# Patient Record
Sex: FEMALE | Race: BLACK OR AFRICAN AMERICAN | Employment: UNEMPLOYED | URBAN - METROPOLITAN AREA
[De-identification: names, ages, dates, MRNs, and addresses within clinical notes are randomized per-mention and may not be internally consistent; named-entity substitution may affect disease eponyms.]

---
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
## 2020-09-21 LAB
ABO, EXTERNAL RESULT: NORMAL
ABO/RH: NORMAL
ANTIBODY SCREEN: NORMAL
C. TRACHOMATIS, EXTERNAL RESULT: NEGATIVE
HEP B, EXTERNAL RESULT: NORMAL
HEPATITIS C ANTIBODY INTERPRETATION: NORMAL
HEPATITIS C ANTIBODY, EXTERNAL RESULT: NORMAL
HIV, EXTERNAL RESULT: NORMAL
N. GONORRHOEAE, EXTERNAL RESULT: NEGATIVE
RH FACTOR, EXTERNAL RESULT: NORMAL
RPR, EXTERNAL RESULT: NORMAL
RUBELLA TITER, EXTERNAL RESULT: NORMAL

## 2020-09-22 LAB
BASOPHILS ABSOLUTE: 0.1 K/UL (ref 0–0.2)
BASOPHILS RELATIVE PERCENT: 0.6 %
EOSINOPHILS ABSOLUTE: 0 K/UL (ref 0–0.6)
EOSINOPHILS RELATIVE PERCENT: 0.5 %
HCT VFR BLD CALC: 34.7 % (ref 36–48)
HEMOGLOBIN: 11.4 G/DL (ref 12–16)
HEPATITIS B SURFACE ANTIGEN INTERPRETATION: ABNORMAL
HIV AG/AB: NORMAL
HIV ANTIGEN: NORMAL
HIV-1 ANTIBODY: NORMAL
HIV-2 AB: NORMAL
LYMPHOCYTES ABSOLUTE: 2.3 K/UL (ref 1–5.1)
LYMPHOCYTES RELATIVE PERCENT: 22.9 %
MCH RBC QN AUTO: 26.4 PG (ref 26–34)
MCHC RBC AUTO-ENTMCNC: 32.8 G/DL (ref 31–36)
MCV RBC AUTO: 80.5 FL (ref 80–100)
MONOCYTES ABSOLUTE: 0.5 K/UL (ref 0–1.3)
MONOCYTES RELATIVE PERCENT: 5.4 %
NEUTROPHILS ABSOLUTE: 7 K/UL (ref 1.7–7.7)
NEUTROPHILS RELATIVE PERCENT: 70.6 %
PDW BLD-RTO: 16 % (ref 12.4–15.4)
PLATELET # BLD: 445 K/UL (ref 135–450)
PMV BLD AUTO: 8.3 FL (ref 5–10.5)
RBC # BLD: 4.32 M/UL (ref 4–5.2)
RUBELLA ANTIBODY IGG: 77 IU/ML
TOTAL SYPHILLIS IGG/IGM: ABNORMAL
WBC # BLD: 9.9 K/UL (ref 4–11)

## 2020-09-23 LAB
ORGANISM: ABNORMAL
URINE CULTURE, ROUTINE: ABNORMAL
URINE CULTURE, ROUTINE: ABNORMAL

## 2021-02-18 LAB
GLUCOSE CHALLENGE: 96 MG/DL
HCT VFR BLD CALC: 28.3 % (ref 36–48)
HEMOGLOBIN: 9.2 G/DL (ref 12–16)
MCH RBC QN AUTO: 27.3 PG (ref 26–34)
MCHC RBC AUTO-ENTMCNC: 32.7 G/DL (ref 31–36)
MCV RBC AUTO: 83.4 FL (ref 80–100)
PDW BLD-RTO: 14.5 % (ref 12.4–15.4)
PLATELET # BLD: 388 K/UL (ref 135–450)
PMV BLD AUTO: 8.1 FL (ref 5–10.5)
RBC # BLD: 3.39 M/UL (ref 4–5.2)
WBC # BLD: 9.2 K/UL (ref 4–11)

## 2021-04-21 LAB — GBS, EXTERNAL RESULT: POSITIVE

## 2021-04-24 ENCOUNTER — ANESTHESIA (OUTPATIENT)
Dept: LABOR AND DELIVERY | Age: 21
DRG: 540 | End: 2021-04-24
Payer: COMMERCIAL

## 2021-04-24 ENCOUNTER — ANESTHESIA EVENT (OUTPATIENT)
Dept: LABOR AND DELIVERY | Age: 21
DRG: 540 | End: 2021-04-24
Payer: COMMERCIAL

## 2021-04-24 ENCOUNTER — HOSPITAL ENCOUNTER (INPATIENT)
Age: 21
LOS: 2 days | Discharge: HOME | DRG: 540 | End: 2021-04-26
Attending: OBSTETRICS & GYNECOLOGY | Admitting: OBSTETRICS & GYNECOLOGY
Payer: COMMERCIAL

## 2021-04-24 VITALS
TEMPERATURE: 98.2 F | RESPIRATION RATE: 1 BRPM | OXYGEN SATURATION: 96 % | DIASTOLIC BLOOD PRESSURE: 54 MMHG | SYSTOLIC BLOOD PRESSURE: 111 MMHG

## 2021-04-24 LAB
ABO/RH: NORMAL
AMPHETAMINE SCREEN, URINE: NORMAL
ANTIBODY SCREEN: NORMAL
APTT: 28.3 SEC (ref 24.2–36.2)
BARBITURATE SCREEN URINE: NORMAL
BASOPHILS ABSOLUTE: 0 K/UL (ref 0–0.2)
BASOPHILS ABSOLUTE: 0.1 K/UL (ref 0–0.2)
BASOPHILS RELATIVE PERCENT: 0.6 %
BASOPHILS RELATIVE PERCENT: 0.7 %
BENZODIAZEPINE SCREEN, URINE: NORMAL
BLOOD BANK DISPENSE STATUS: NORMAL
BLOOD BANK DISPENSE STATUS: NORMAL
BLOOD BANK PRODUCT CODE: NORMAL
BLOOD BANK PRODUCT CODE: NORMAL
BPU ID: NORMAL
BPU ID: NORMAL
BUPRENORPHINE URINE: NORMAL
CANNABINOID SCREEN URINE: NORMAL
COCAINE METABOLITE SCREEN URINE: NORMAL
DESCRIPTION BLOOD BANK: NORMAL
DESCRIPTION BLOOD BANK: NORMAL
EOSINOPHILS ABSOLUTE: 0 K/UL (ref 0–0.6)
EOSINOPHILS ABSOLUTE: 0 K/UL (ref 0–0.6)
EOSINOPHILS RELATIVE PERCENT: 0.1 %
EOSINOPHILS RELATIVE PERCENT: 0.5 %
FIBRINOGEN: 236 MG/DL (ref 200–397)
HCT VFR BLD CALC: 24.7 % (ref 36–48)
HCT VFR BLD CALC: 27.1 % (ref 36–48)
HCT VFR BLD CALC: 35.4 % (ref 36–48)
HEMOGLOBIN: 11 G/DL (ref 12–16)
HEMOGLOBIN: 7.7 G/DL (ref 12–16)
HEMOGLOBIN: 8.6 G/DL (ref 12–16)
INR BLD: 1 (ref 0.86–1.14)
LYMPHOCYTES ABSOLUTE: 1.2 K/UL (ref 1–5.1)
LYMPHOCYTES ABSOLUTE: 1.3 K/UL (ref 1–5.1)
LYMPHOCYTES RELATIVE PERCENT: 19 %
LYMPHOCYTES RELATIVE PERCENT: 9.7 %
Lab: NORMAL
MCH RBC QN AUTO: 24.2 PG (ref 26–34)
MCH RBC QN AUTO: 24.3 PG (ref 26–34)
MCH RBC QN AUTO: 24.5 PG (ref 26–34)
MCHC RBC AUTO-ENTMCNC: 31.1 G/DL (ref 31–36)
MCHC RBC AUTO-ENTMCNC: 31.2 G/DL (ref 31–36)
MCHC RBC AUTO-ENTMCNC: 31.6 G/DL (ref 31–36)
MCV RBC AUTO: 77.4 FL (ref 80–100)
MCV RBC AUTO: 77.7 FL (ref 80–100)
MCV RBC AUTO: 78 FL (ref 80–100)
METHADONE SCREEN, URINE: NORMAL
MONOCYTES ABSOLUTE: 0.2 K/UL (ref 0–1.3)
MONOCYTES ABSOLUTE: 0.2 K/UL (ref 0–1.3)
MONOCYTES RELATIVE PERCENT: 1.3 %
MONOCYTES RELATIVE PERCENT: 3.1 %
NEUTROPHILS ABSOLUTE: 11 K/UL (ref 1.7–7.7)
NEUTROPHILS ABSOLUTE: 5.4 K/UL (ref 1.7–7.7)
NEUTROPHILS RELATIVE PERCENT: 76.8 %
NEUTROPHILS RELATIVE PERCENT: 88.2 %
OPIATE SCREEN URINE: NORMAL
OXYCODONE URINE: NORMAL
PDW BLD-RTO: 16.7 % (ref 12.4–15.4)
PDW BLD-RTO: 16.7 % (ref 12.4–15.4)
PDW BLD-RTO: 17 % (ref 12.4–15.4)
PH UA: 6
PHENCYCLIDINE SCREEN URINE: NORMAL
PLATELET # BLD: 259 K/UL (ref 135–450)
PLATELET # BLD: 329 K/UL (ref 135–450)
PLATELET # BLD: 330 K/UL (ref 135–450)
PMV BLD AUTO: 7.3 FL (ref 5–10.5)
PMV BLD AUTO: 7.3 FL (ref 5–10.5)
PMV BLD AUTO: 7.5 FL (ref 5–10.5)
PROPOXYPHENE SCREEN: NORMAL
PROTHROMBIN TIME: 11.6 SEC (ref 10–13.2)
RBC # BLD: 3.16 M/UL (ref 4–5.2)
RBC # BLD: 3.51 M/UL (ref 4–5.2)
RBC # BLD: 4.56 M/UL (ref 4–5.2)
WBC # BLD: 12.4 K/UL (ref 4–11)
WBC # BLD: 6.5 K/UL (ref 4–11)
WBC # BLD: 7.1 K/UL (ref 4–11)

## 2021-04-24 PROCEDURE — 2500000003 HC RX 250 WO HCPCS: Performed by: OBSTETRICS & GYNECOLOGY

## 2021-04-24 PROCEDURE — 7100000000 HC PACU RECOVERY - FIRST 15 MIN: Performed by: OBSTETRICS & GYNECOLOGY

## 2021-04-24 PROCEDURE — 3609079900 HC CESAREAN SECTION: Performed by: OBSTETRICS & GYNECOLOGY

## 2021-04-24 PROCEDURE — 85610 PROTHROMBIN TIME: CPT

## 2021-04-24 PROCEDURE — 86923 COMPATIBILITY TEST ELECTRIC: CPT

## 2021-04-24 PROCEDURE — 86850 RBC ANTIBODY SCREEN: CPT

## 2021-04-24 PROCEDURE — 80307 DRUG TEST PRSMV CHEM ANLYZR: CPT

## 2021-04-24 PROCEDURE — 2709999900 HC NON-CHARGEABLE SUPPLY: Performed by: OBSTETRICS & GYNECOLOGY

## 2021-04-24 PROCEDURE — 3700000001 HC ADD 15 MINUTES (ANESTHESIA): Performed by: OBSTETRICS & GYNECOLOGY

## 2021-04-24 PROCEDURE — 1200000000 HC SEMI PRIVATE

## 2021-04-24 PROCEDURE — 85027 COMPLETE CBC AUTOMATED: CPT

## 2021-04-24 PROCEDURE — 6360000002 HC RX W HCPCS: Performed by: NURSE ANESTHETIST, CERTIFIED REGISTERED

## 2021-04-24 PROCEDURE — 85025 COMPLETE CBC W/AUTO DIFF WBC: CPT

## 2021-04-24 PROCEDURE — 6370000000 HC RX 637 (ALT 250 FOR IP)

## 2021-04-24 PROCEDURE — 2500000003 HC RX 250 WO HCPCS: Performed by: NURSE ANESTHETIST, CERTIFIED REGISTERED

## 2021-04-24 PROCEDURE — 36430 TRANSFUSION BLD/BLD COMPNT: CPT

## 2021-04-24 PROCEDURE — P9016 RBC LEUKOCYTES REDUCED: HCPCS

## 2021-04-24 PROCEDURE — 6370000000 HC RX 637 (ALT 250 FOR IP): Performed by: OBSTETRICS & GYNECOLOGY

## 2021-04-24 PROCEDURE — 2580000003 HC RX 258: Performed by: NURSE ANESTHETIST, CERTIFIED REGISTERED

## 2021-04-24 PROCEDURE — 86780 TREPONEMA PALLIDUM: CPT

## 2021-04-24 PROCEDURE — 7100000001 HC PACU RECOVERY - ADDTL 15 MIN: Performed by: OBSTETRICS & GYNECOLOGY

## 2021-04-24 PROCEDURE — 2580000003 HC RX 258: Performed by: OBSTETRICS & GYNECOLOGY

## 2021-04-24 PROCEDURE — 85730 THROMBOPLASTIN TIME PARTIAL: CPT

## 2021-04-24 PROCEDURE — 6360000002 HC RX W HCPCS: Performed by: OBSTETRICS & GYNECOLOGY

## 2021-04-24 PROCEDURE — 6360000002 HC RX W HCPCS

## 2021-04-24 PROCEDURE — 86900 BLOOD TYPING SEROLOGIC ABO: CPT

## 2021-04-24 PROCEDURE — 85384 FIBRINOGEN ACTIVITY: CPT

## 2021-04-24 PROCEDURE — 3700000000 HC ANESTHESIA ATTENDED CARE: Performed by: OBSTETRICS & GYNECOLOGY

## 2021-04-24 PROCEDURE — 86901 BLOOD TYPING SEROLOGIC RH(D): CPT

## 2021-04-24 RX ORDER — SODIUM CHLORIDE, SODIUM LACTATE, POTASSIUM CHLORIDE, AND CALCIUM CHLORIDE .6; .31; .03; .02 G/100ML; G/100ML; G/100ML; G/100ML
INJECTION, SOLUTION INTRAVENOUS ONCE
Status: DISCONTINUED | OUTPATIENT
Start: 2021-04-24 | End: 2021-04-24

## 2021-04-24 RX ORDER — SODIUM CHLORIDE 0.9 % (FLUSH) 0.9 %
10 SYRINGE (ML) INJECTION PRN
Status: DISCONTINUED | OUTPATIENT
Start: 2021-04-24 | End: 2021-04-24

## 2021-04-24 RX ORDER — OXYCODONE HYDROCHLORIDE 5 MG/1
5 TABLET ORAL EVERY 4 HOURS PRN
Status: DISCONTINUED | OUTPATIENT
Start: 2021-04-24 | End: 2021-04-26 | Stop reason: HOSPADM

## 2021-04-24 RX ORDER — DEXAMETHASONE SODIUM PHOSPHATE 4 MG/ML
INJECTION, SOLUTION INTRA-ARTICULAR; INTRALESIONAL; INTRAMUSCULAR; INTRAVENOUS; SOFT TISSUE PRN
Status: DISCONTINUED | OUTPATIENT
Start: 2021-04-24 | End: 2021-04-24 | Stop reason: SDUPTHER

## 2021-04-24 RX ORDER — SODIUM CHLORIDE 9 MG/ML
INJECTION, SOLUTION INTRAVENOUS PRN
Status: DISCONTINUED | OUTPATIENT
Start: 2021-04-24 | End: 2021-04-26 | Stop reason: HOSPADM

## 2021-04-24 RX ORDER — OXYCODONE HYDROCHLORIDE 5 MG/1
10 TABLET ORAL EVERY 4 HOURS PRN
Status: DISCONTINUED | OUTPATIENT
Start: 2021-04-24 | End: 2021-04-26 | Stop reason: HOSPADM

## 2021-04-24 RX ORDER — ACETAMINOPHEN 500 MG
1000 TABLET ORAL EVERY 8 HOURS SCHEDULED
Status: DISCONTINUED | OUTPATIENT
Start: 2021-04-24 | End: 2021-04-26 | Stop reason: HOSPADM

## 2021-04-24 RX ORDER — SODIUM CHLORIDE 0.9 % (FLUSH) 0.9 %
10 SYRINGE (ML) INJECTION EVERY 12 HOURS SCHEDULED
Status: DISCONTINUED | OUTPATIENT
Start: 2021-04-24 | End: 2021-04-24

## 2021-04-24 RX ORDER — SODIUM CHLORIDE 9 MG/ML
INJECTION, SOLUTION INTRAVENOUS PRN
Status: COMPLETED | OUTPATIENT
Start: 2021-04-24 | End: 2021-04-24

## 2021-04-24 RX ORDER — TRISODIUM CITRATE DIHYDRATE AND CITRIC ACID MONOHYDRATE 500; 334 MG/5ML; MG/5ML
30 SOLUTION ORAL ONCE
Status: COMPLETED | OUTPATIENT
Start: 2021-04-24 | End: 2021-04-24

## 2021-04-24 RX ORDER — OXYTOCIN 10 [USP'U]/ML
INJECTION, SOLUTION INTRAMUSCULAR; INTRAVENOUS PRN
Status: DISCONTINUED | OUTPATIENT
Start: 2021-04-24 | End: 2021-04-24 | Stop reason: SDUPTHER

## 2021-04-24 RX ORDER — SODIUM CHLORIDE 0.9 % (FLUSH) 0.9 %
5-40 SYRINGE (ML) INJECTION EVERY 12 HOURS SCHEDULED
Status: DISCONTINUED | OUTPATIENT
Start: 2021-04-24 | End: 2021-04-26 | Stop reason: HOSPADM

## 2021-04-24 RX ORDER — SODIUM CHLORIDE, SODIUM LACTATE, POTASSIUM CHLORIDE, CALCIUM CHLORIDE 600; 310; 30; 20 MG/100ML; MG/100ML; MG/100ML; MG/100ML
INJECTION, SOLUTION INTRAVENOUS CONTINUOUS
Status: DISCONTINUED | OUTPATIENT
Start: 2021-04-24 | End: 2021-04-24

## 2021-04-24 RX ORDER — MODIFIED LANOLIN
OINTMENT (GRAM) TOPICAL
Status: DISCONTINUED | OUTPATIENT
Start: 2021-04-24 | End: 2021-04-26 | Stop reason: HOSPADM

## 2021-04-24 RX ORDER — MISOPROSTOL 100 UG/1
TABLET ORAL
Status: COMPLETED
Start: 2021-04-24 | End: 2021-04-24

## 2021-04-24 RX ORDER — KETOROLAC TROMETHAMINE 30 MG/ML
INJECTION, SOLUTION INTRAMUSCULAR; INTRAVENOUS PRN
Status: DISCONTINUED | OUTPATIENT
Start: 2021-04-24 | End: 2021-04-24 | Stop reason: SDUPTHER

## 2021-04-24 RX ORDER — IBUPROFEN 800 MG/1
800 TABLET ORAL EVERY 8 HOURS PRN
Qty: 30 TABLET | Refills: 0 | Status: SHIPPED | OUTPATIENT
Start: 2021-04-24

## 2021-04-24 RX ORDER — OXYCODONE HYDROCHLORIDE 5 MG/1
5 TABLET ORAL EVERY 6 HOURS PRN
Qty: 12 TABLET | Refills: 0 | Status: SHIPPED | OUTPATIENT
Start: 2021-04-24 | End: 2021-04-27

## 2021-04-24 RX ORDER — BUPIVACAINE HYDROCHLORIDE 7.5 MG/ML
INJECTION, SOLUTION INTRASPINAL PRN
Status: DISCONTINUED | OUTPATIENT
Start: 2021-04-24 | End: 2021-04-24 | Stop reason: SDUPTHER

## 2021-04-24 RX ORDER — ONDANSETRON 2 MG/ML
INJECTION INTRAMUSCULAR; INTRAVENOUS PRN
Status: DISCONTINUED | OUTPATIENT
Start: 2021-04-24 | End: 2021-04-24 | Stop reason: SDUPTHER

## 2021-04-24 RX ORDER — ACETAMINOPHEN 500 MG
1000 TABLET ORAL ONCE
Status: COMPLETED | OUTPATIENT
Start: 2021-04-24 | End: 2021-04-24

## 2021-04-24 RX ORDER — METHYLERGONOVINE MALEATE 0.2 MG/ML
INJECTION INTRAVENOUS
Status: COMPLETED
Start: 2021-04-24 | End: 2021-04-24

## 2021-04-24 RX ORDER — METOCLOPRAMIDE HYDROCHLORIDE 5 MG/ML
10 INJECTION INTRAMUSCULAR; INTRAVENOUS ONCE
Status: COMPLETED | OUTPATIENT
Start: 2021-04-24 | End: 2021-04-24

## 2021-04-24 RX ORDER — KETOROLAC TROMETHAMINE 30 MG/ML
30 INJECTION, SOLUTION INTRAMUSCULAR; INTRAVENOUS EVERY 8 HOURS
Status: DISCONTINUED | OUTPATIENT
Start: 2021-04-24 | End: 2021-04-25 | Stop reason: ALTCHOICE

## 2021-04-24 RX ORDER — DOCUSATE SODIUM 100 MG/1
100 CAPSULE, LIQUID FILLED ORAL 2 TIMES DAILY
Status: DISCONTINUED | OUTPATIENT
Start: 2021-04-24 | End: 2021-04-26 | Stop reason: HOSPADM

## 2021-04-24 RX ORDER — IBUPROFEN 800 MG/1
800 TABLET ORAL EVERY 8 HOURS SCHEDULED
Status: DISCONTINUED | OUTPATIENT
Start: 2021-04-24 | End: 2021-04-26 | Stop reason: HOSPADM

## 2021-04-24 RX ORDER — SODIUM CHLORIDE 9 MG/ML
25 INJECTION, SOLUTION INTRAVENOUS PRN
Status: DISCONTINUED | OUTPATIENT
Start: 2021-04-24 | End: 2021-04-26 | Stop reason: HOSPADM

## 2021-04-24 RX ORDER — MORPHINE SULFATE 1 MG/ML
INJECTION, SOLUTION EPIDURAL; INTRATHECAL; INTRAVENOUS PRN
Status: DISCONTINUED | OUTPATIENT
Start: 2021-04-24 | End: 2021-04-24 | Stop reason: SDUPTHER

## 2021-04-24 RX ORDER — ACETAMINOPHEN 325 MG/1
650 TABLET ORAL EVERY 4 HOURS PRN
Status: DISCONTINUED | OUTPATIENT
Start: 2021-04-24 | End: 2021-04-26 | Stop reason: HOSPADM

## 2021-04-24 RX ORDER — SODIUM CHLORIDE, SODIUM LACTATE, POTASSIUM CHLORIDE, CALCIUM CHLORIDE 600; 310; 30; 20 MG/100ML; MG/100ML; MG/100ML; MG/100ML
INJECTION, SOLUTION INTRAVENOUS CONTINUOUS PRN
Status: DISCONTINUED | OUTPATIENT
Start: 2021-04-24 | End: 2021-04-24 | Stop reason: SDUPTHER

## 2021-04-24 RX ORDER — SODIUM CHLORIDE 9 MG/ML
25 INJECTION, SOLUTION INTRAVENOUS PRN
Status: DISCONTINUED | OUTPATIENT
Start: 2021-04-24 | End: 2021-04-24

## 2021-04-24 RX ORDER — SODIUM CHLORIDE, SODIUM LACTATE, POTASSIUM CHLORIDE, CALCIUM CHLORIDE 600; 310; 30; 20 MG/100ML; MG/100ML; MG/100ML; MG/100ML
INJECTION, SOLUTION INTRAVENOUS CONTINUOUS
Status: DISCONTINUED | OUTPATIENT
Start: 2021-04-24 | End: 2021-04-26 | Stop reason: HOSPADM

## 2021-04-24 RX ORDER — SODIUM CHLORIDE, SODIUM LACTATE, POTASSIUM CHLORIDE, AND CALCIUM CHLORIDE .6; .31; .03; .02 G/100ML; G/100ML; G/100ML; G/100ML
1000 INJECTION, SOLUTION INTRAVENOUS ONCE
Status: COMPLETED | OUTPATIENT
Start: 2021-04-24 | End: 2021-04-24

## 2021-04-24 RX ORDER — SODIUM CHLORIDE, SODIUM LACTATE, POTASSIUM CHLORIDE, CALCIUM CHLORIDE 600; 310; 30; 20 MG/100ML; MG/100ML; MG/100ML; MG/100ML
1000 INJECTION, SOLUTION INTRAVENOUS ONCE
Status: DISCONTINUED | OUTPATIENT
Start: 2021-04-24 | End: 2021-04-24

## 2021-04-24 RX ORDER — PROMETHAZINE HYDROCHLORIDE 25 MG/ML
INJECTION, SOLUTION INTRAMUSCULAR; INTRAVENOUS PRN
Status: DISCONTINUED | OUTPATIENT
Start: 2021-04-24 | End: 2021-04-24 | Stop reason: SDUPTHER

## 2021-04-24 RX ORDER — SODIUM CHLORIDE 0.9 % (FLUSH) 0.9 %
5-40 SYRINGE (ML) INJECTION PRN
Status: DISCONTINUED | OUTPATIENT
Start: 2021-04-24 | End: 2021-04-26 | Stop reason: HOSPADM

## 2021-04-24 RX ADMIN — SODIUM CHLORIDE: 9 INJECTION, SOLUTION INTRAVENOUS at 13:22

## 2021-04-24 RX ADMIN — SODIUM CHLORIDE, POTASSIUM CHLORIDE, SODIUM LACTATE AND CALCIUM CHLORIDE: 600; 310; 30; 20 INJECTION, SOLUTION INTRAVENOUS at 15:54

## 2021-04-24 RX ADMIN — CEFAZOLIN SODIUM 2000 MG: 10 INJECTION, POWDER, FOR SOLUTION INTRAVENOUS at 09:45

## 2021-04-24 RX ADMIN — MISOPROSTOL 1000 MCG: 100 TABLET ORAL at 12:38

## 2021-04-24 RX ADMIN — KETOROLAC TROMETHAMINE 30 MG: 30 INJECTION, SOLUTION INTRAMUSCULAR; INTRAVENOUS at 11:08

## 2021-04-24 RX ADMIN — METOCLOPRAMIDE 10 MG: 5 INJECTION, SOLUTION INTRAMUSCULAR; INTRAVENOUS at 09:46

## 2021-04-24 RX ADMIN — Medication 166.7 ML: at 12:55

## 2021-04-24 RX ADMIN — Medication 87.3 MILLI-UNITS/MIN: at 12:09

## 2021-04-24 RX ADMIN — KETOROLAC TROMETHAMINE 30 MG: 30 INJECTION, SOLUTION INTRAMUSCULAR; INTRAVENOUS at 17:27

## 2021-04-24 RX ADMIN — FAMOTIDINE 20 MG: 10 INJECTION, SOLUTION INTRAVENOUS at 09:46

## 2021-04-24 RX ADMIN — DEXAMETHASONE SODIUM PHOSPHATE 8 MG: 4 INJECTION, SOLUTION INTRAMUSCULAR; INTRAVENOUS at 11:08

## 2021-04-24 RX ADMIN — ACETAMINOPHEN 1000 MG: 500 TABLET ORAL at 09:46

## 2021-04-24 RX ADMIN — MORPHINE SULFATE 3.8 MG: 1 INJECTION EPIDURAL; INTRATHECAL; INTRAVENOUS at 11:08

## 2021-04-24 RX ADMIN — ONDANSETRON 4 MG: 2 INJECTION INTRAMUSCULAR; INTRAVENOUS at 10:37

## 2021-04-24 RX ADMIN — MORPHINE SULFATE 0.2 MG: 1 INJECTION EPIDURAL; INTRATHECAL; INTRAVENOUS at 10:41

## 2021-04-24 RX ADMIN — CEFAZOLIN SODIUM 2000 MG: 10 INJECTION, POWDER, FOR SOLUTION INTRAVENOUS at 17:28

## 2021-04-24 RX ADMIN — OXYTOCIN 20 UNITS: 10 INJECTION, SOLUTION INTRAMUSCULAR; INTRAVENOUS at 11:04

## 2021-04-24 RX ADMIN — SODIUM CITRATE AND CITRIC ACID MONOHYDRATE 30 ML: 500; 334 SOLUTION ORAL at 09:46

## 2021-04-24 RX ADMIN — PROMETHAZINE HYDROCHLORIDE 25 MG: 25 INJECTION INTRAMUSCULAR; INTRAVENOUS at 11:08

## 2021-04-24 RX ADMIN — BUPIVACAINE HYDROCHLORIDE 2 ML: 7.5 INJECTION, SOLUTION INTRASPINAL at 10:41

## 2021-04-24 RX ADMIN — SODIUM CHLORIDE, POTASSIUM CHLORIDE, SODIUM LACTATE AND CALCIUM CHLORIDE 1000 ML: 600; 310; 30; 20 INJECTION, SOLUTION INTRAVENOUS at 08:42

## 2021-04-24 RX ADMIN — SODIUM CHLORIDE, POTASSIUM CHLORIDE, SODIUM LACTATE AND CALCIUM CHLORIDE: 600; 310; 30; 20 INJECTION, SOLUTION INTRAVENOUS at 10:37

## 2021-04-24 RX ADMIN — ACETAMINOPHEN 1000 MG: 500 TABLET ORAL at 22:34

## 2021-04-24 RX ADMIN — METHYLERGONOVINE MALEATE 200 MCG: 0.2 INJECTION INTRAVENOUS at 12:43

## 2021-04-24 RX ADMIN — SODIUM CHLORIDE, POTASSIUM CHLORIDE, SODIUM LACTATE AND CALCIUM CHLORIDE: 600; 310; 30; 20 INJECTION, SOLUTION INTRAVENOUS at 10:57

## 2021-04-24 ASSESSMENT — PULMONARY FUNCTION TESTS
PIF_VALUE: 0
PIF_VALUE: 1
PIF_VALUE: 1
PIF_VALUE: 0
PIF_VALUE: 90
PIF_VALUE: 0
PIF_VALUE: 31
PIF_VALUE: 0
PIF_VALUE: 1
PIF_VALUE: 0
PIF_VALUE: 1
PIF_VALUE: 0
PIF_VALUE: 1
PIF_VALUE: 0
PIF_VALUE: 1
PIF_VALUE: 0
PIF_VALUE: -1
PIF_VALUE: 0
PIF_VALUE: 1
PIF_VALUE: 0

## 2021-04-24 ASSESSMENT — PAIN SCALES - GENERAL: PAINLEVEL_OUTOF10: 0

## 2021-04-24 NOTE — PROGRESS NOTES
CTSP with increased bleeding and passing clots. Approx 500 cc of blood and clot on the pad. QBL 1400 in OR. 1000 micrograms of cytotec placed rectally, 0.2mg of methergine given IM. Bleeding decreased. VSS. Stat Hg 7.7 (8.6 preop). Continue close observation. Transfuse 1 u PRBC as anticipate Hg<6 when equilibrates.

## 2021-04-24 NOTE — DISCHARGE SUMMARY
Obstetrical Discharge Form    Gestational Age: 39w0d    Antepartum complications: Previous Cesaren Section, Scant care, Anemia    Date of Delivery: 21      Type of Delivery: Repeat LTCS    Delivered By: Ligia Mann. Wilkinson    Baby:      Information for the patient's :  Kp Shah [[id number]]   APGAR One: 9     Information for the patient's :  Kp Shah [[id number]]   APGAR Five: 8     Information for the patient's :  Kp Pablo [[id number]]   Birth Weight: 8 lb 12.7 oz (3.99 kg)       Anesthesia: Epidural    Intrapartum complications: None    Postpartum complications: anemia    Consulted by Internists for chest pain, sinus bradycardia, no additional intervention needed    Discharge Medication:    Timbo Bush   Home Medication Instructions [id number]    Printed on:21 7877   Medication Information                      ibuprofen (ADVIL;MOTRIN) 800 MG tablet  Take 1 tablet by mouth every 8 hours as needed for Pain                  Discharge Condition:  good    Discharge Date: 21    PLAN:  Follow up in 6 weeks for routine PP visit  All questions answered  D/C summary begun at delivery for D/C planning purposes, any delay in discharge from ordered D/C date due to  factors.

## 2021-04-24 NOTE — PROGRESS NOTES
session: Not on file    Stress: Not on file   Relationships    Social connections     Talks on phone: Not on file     Gets together: Not on file     Attends Synagogue service: Not on file     Active member of club or organization: Not on file     Attends meetings of clubs or organizations: Not on file     Relationship status: Not on file    Intimate partner violence     Fear of current or ex partner: Not on file     Emotionally abused: Not on file     Physically abused: Not on file     Forced sexual activity: Not on file   Other Topics Concern    Not on file   Social History Narrative    Not on file     Family History:       Problem Relation Age of Onset    High Blood Pressure Mother     Stroke Paternal Grandfather     High Blood Pressure Father      Medications Prior to Admission:  Medications Prior to Admission: Prenatal MV-Min-Fe Fum-FA-DHA (PRENATAL 1 PO), Take 1 tablet by mouth daily  ferrous sulfate 325 (65 Fe) MG tablet, Take 325 mg by mouth daily (with breakfast)  [DISCONTINUED] ibuprofen (ADVIL;MOTRIN) 800 MG tablet, Take 1 tablet by mouth every 6 hours as needed for Pain  [DISCONTINUED] albuterol (ACCUNEB) 0.63 MG/3ML nebulizer solution, Take 1 ampule by nebulization every 6 hours as needed for Wheezing Indications: inhaler    REVIEW OF SYSTEMS:    CONSTITUTIONAL:  negative  RESPIRATORY:  negative  CARDIOVASCULAR:  negative  GASTROINTESTINAL:  negative  ALLERGIC/IMMUNOLOGIC:  negative  NEUROLOGICAL:  negative  BEHAVIOR/PSYCH:  negative    PHYSICAL EXAM:  Vitals:    04/24/21 0832 04/24/21 0855   BP:  131/65   Pulse:  74   Resp:  18   Temp:  98.1 °F (36.7 °C)   TempSrc:  Oral   Weight: 207 lb (93.9 kg)    Height: 5' 2\" (1.575 m)      General appearance:  awake, alert, cooperative, no apparent distress, and appears stated age  Neurologic:  Awake, alert, oriented to name, place and time.     Lungs:  No increased work of breathing, good air exchange  Abdomen:  Soft, non tender, gravid, consistent with

## 2021-04-24 NOTE — FLOWSHEET NOTE
Patient transferred to postpartum by self and settled into postpartum room 4411. Pt oriented to folder and postpartum care. Oriented to call light, phone and ordering meals. Postpartum RN's name and phone number posted for pt. Siderails up x2. Pt oriented to equipment. Report given. Pt included in discussion and all questions answered. Discharge prescriptions given to pt for Ibuprofen and oxycodone with instructions on use and side effects. See AVS.  Pt verbalized understanding of medications.

## 2021-04-24 NOTE — PROCEDURES
Department of Obstetrics and Gynecology  Obstetrical Operative Report         Pre-operative Diagnosis:  Previous  Section , Anemia, Scant care  Post-operative Diagnosis:  Same    Procedure:  repeat low transverse  section    Surgeon:   Kayden Amin     Assistant(s):   Mich Quintero    Anesthesia:  Spinal anesthesia    Tubes, uterus, ovaries:  normal    Estimated blood loss:  700ml    Specimens:  none    Complications:  none    Information for the patient's :  Savanah Garcia [[id number]]          Condition:    Infant stable, transfer to General Care Nursery  Mother stable, transfer to labor & delivery room    TECHNIQUE:  The patient was brought to the operating room and following the  placement of adequate Spinal Anesthesia, the patient was placed in dorsal  supine position and wedged to the left. A Higginbotham catheter was placed in the  patient's bladder and Juve pumps were placed on her lower extremities for  DVT prophylaxis. Following this, patient was prepped and draped in the usual  sterile manner for a  section. A transverse incision was made in the skin with a scalpel. Incision was carried down through fat to the fascia which was incised in the midline. Fascial incision was extended bilaterally  using curved scissors. Fascia was then bluntly and sharply dissected off underlying muscles and then muscle was sharply and bluntly dissected  in the midline exposing the underlying peritoneum which was entered bluntly. Filmy and dense adhesions lysed from uterus to anterior abdominal wall. The incision was again extended superiorly and inferiorly, taking  care so as not to injure intraabdominal organs or bladder. An Christopher retractor was inserted. A  Low transverse uterine incision was then made with a scalpel. Upon entering the cavity, clear fluid was noted. The incision was extended bilaterally. The infant was then easily delivered from a cephalic presentation.   Upon delivery of the infant, nose and mouth were bulb suctioned. Cord was clamped and cut and the infant was handed off the table  in a routine sterile manner. Cord gases were collected. Cord blood was collected. The placenta was manually removed. The incision was then closed in 1 layers using a 0 - V lock. The incision was reexamined and noted to be hemostatic. Clots were removed. The bladder was back filled with sterile milk, bladder integrity confirmed. The peritoneum was closed using 3-0 vicryl suture. The Fascia was closed with 0-vicryl  The fat was reapproximated using a running 3-0  Vicryl suture. Skin was reapproximated using subcuticular  4-0 Vicryl  suture. All sponge, needle, instrument and lap were reported as correct at  the end of the procedure. The patient tolerated the procedure well and went  to the recovery room in stable condition.

## 2021-04-24 NOTE — FLOWSHEET NOTE
Charge nurse and Dr Farhana Chance called to come assess patient with the increase in bleeding noted with this fundal check. Additional staff called, pt medicated with cytotec and methergine and pitocin bolused. Labs drawn and orders obtained to transfuse one unit PRBC's and redraw CBC post transfusion. Stephanie Mosher

## 2021-04-24 NOTE — ANESTHESIA PROCEDURE NOTES
Spinal Block    Start time: 4/24/2021 10:37 AM  Reason for block: post-op pain management and primary anesthetic  Staffing  Performed: resident/CRNA   Anesthesiologist: Alexandre Paz MD  Resident/CRNA: JOJO Subramanian CRNA  Preanesthetic Checklist  Completed: patient identified, IV checked, site marked, risks and benefits discussed, surgical consent, monitors and equipment checked, pre-op evaluation, timeout performed, anesthesia consent given, oxygen available and patient being monitored  Spinal Block  Patient position: sitting  Prep: ChloraPrep and site prepped and draped  Patient monitoring: cardiac monitor, continuous pulse ox, continuous capnometry and frequent blood pressure checks  Approach: midline  Location: L3/L4  Provider prep: mask and sterile gloves  Anesthesia block local: 25G. Dose: 0.2  Agent: bupivacaine (HYPERBARIC)  Adjuvant: duramorph  Dose: 2  Dose: 2  Needle  Needle type: Pencan   Needle gauge: 25 G  Needle length: 3.5 in  Assessment  Sensory level: T4  Swirl obtained: Yes  CSF: clear  Attempts: 1  Hemodynamics: stable  Additional Notes  To OR assist to table O2 and monitors applied. Timeout performed. Sterile chloraprep X 2. 3m 1% lidocaine(25g) at L3-4. 25g Pencan X1. Positive clear CSF X4. Negative paraesthesia or pain with injection. Good T4 obtained.

## 2021-04-24 NOTE — ANESTHESIA PRE PROCEDURE
ringers infusion 1,000 mL  1,000 mL Intravenous Once Apple Mayes MD           Allergies:  No Known Allergies    Problem List:    Patient Active Problem List   Diagnosis Code    Iron deficiency anemia of mother during pregnancy O99.019, D50.9    Urinary tract infection in mother during third trimester of pregnancy O23.43    Term pregnancy delivered O80       Past Medical History:        Diagnosis Date    Anemia     Fe and Venofer       Past Surgical History:        Procedure Laterality Date     SECTION N/A 2019     SECTION performed by aJred Kc MD at [facility] L[facility] OR       Social History:    Social History     Tobacco Use    Smoking status: Never Smoker    Smokeless tobacco: Never Used   Substance Use Topics    Alcohol use: No                                Counseling given: Not Answered      Vital Signs (Current):   Vitals:    21 0832   Weight: 207 lb (93.9 kg)   Height: 5' 2\" (1.575 m)                                              BP Readings from Last 3 Encounters:   19 124/78   19 125/62   19 (!) 105/56       NPO Status: Time of last liquid consumption:                         Time of last solid consumption:                         Date of last liquid consumption: 21                        Date of last solid food consumption: 21    BMI:   Wt Readings from Last 3 Encounters:   21 207 lb (93.9 kg)   19 211 lb (95.7 kg) (98 %, Z= 2.10)*   19 178 lb (80.7 kg) (94 %, Z= 1.58)*     * Growth percentiles are based on CDC (Girls, 2-20 Years) data. Body mass index is 37.86 kg/m².     CBC:   Lab Results   Component Value Date    WBC 6.5 2021    RBC 3.51 2021    HGB 8.6 2021    HCT 27.1 2021    MCV 77.4 2021    RDW 17.0 2021     2021       CMP: No results found for: NA, K, CL, CO2, BUN, CREATININE, GFRAA, AGRATIO, LABGLOM, GLUCOSE, PROT, CALCIUM, BILITOT, ALKPHOS, AST, ALT    POC Tests: No results for input(s): POCGLU, POCNA, POCK, POCCL, POCBUN, POCHEMO, POCHCT in the last 72 hours. Coags: No results found for: PROTIME, INR, APTT    HCG (If Applicable): No results found for: PREGTESTUR, PREGSERUM, HCG, HCGQUANT     ABGs: No results found for: PHART, PO2ART, URM6SSQ, SHA2YNU, BEART, X2ERKRKX     Type & Screen (If Applicable):  No results found for: LABABO, LABRH    Drug/Infectious Status (If Applicable):  No results found for: HIV, HEPCAB    COVID-19 Screening (If Applicable): No results found for: COVID19        Anesthesia Evaluation  Patient summary reviewed and Nursing notes reviewed  Airway: Mallampati: II        Dental: normal exam         Pulmonary:Negative Pulmonary ROS and normal exam  breath sounds clear to auscultation                             Cardiovascular:Negative CV ROS  Exercise tolerance: good (>4 METS),         NYHA Classification: I    Rhythm: regular  Rate: normal           Beta Blocker:  Not on Beta Blocker         Neuro/Psych:   Negative Neuro/Psych ROS              GI/Hepatic/Renal: Neg GI/Hepatic/Renal ROS            Endo/Other:    (+) blood dyscrasia: anemia:., .          Pt had no PAT visit       Abdominal:           Vascular: negative vascular ROS. Anesthesia Plan      spinal     ASA 2       Induction: rapid sequence. MIPS: Postoperative opioids intended and Prophylactic antiemetics administered. Anesthetic plan and risks discussed with patient. Use of blood products discussed with patient whom consented to blood products. Plan discussed with attending. Discussed spinal procedure for  and risks, benefits, and alternatives, including but not limited to infection, changes in VS, n/v, severe headache, paresthesia, intravascular injection, rare temporary or permanent injury and failed block with GETA back up.  Discussed the addition of duramorph to the spinal. All patient's questions answered. Patient states understands anesthesia plan and the side effects of duramorph and agrees to its utilization and agrees to proceed.  Choice of anesthetic will be determined by clinical condition at the time of anesthesia initiation        JOJO Hinkle CRNA   4/24/2021

## 2021-04-25 LAB
ANION GAP SERPL CALCULATED.3IONS-SCNC: 9 MMOL/L (ref 3–16)
BUN BLDV-MCNC: 9 MG/DL (ref 7–20)
CALCIUM SERPL-MCNC: 8.4 MG/DL (ref 8.3–10.6)
CHLORIDE BLD-SCNC: 106 MMOL/L (ref 99–110)
CO2: 24 MMOL/L (ref 21–32)
CREAT SERPL-MCNC: 0.6 MG/DL (ref 0.6–1.1)
GFR AFRICAN AMERICAN: >60
GFR NON-AFRICAN AMERICAN: >60
GLUCOSE BLD-MCNC: 86 MG/DL (ref 70–99)
HCT VFR BLD CALC: 31.9 % (ref 36–48)
HEMOGLOBIN: 10.2 G/DL (ref 12–16)
MCH RBC QN AUTO: 25.1 PG (ref 26–34)
MCHC RBC AUTO-ENTMCNC: 32 G/DL (ref 31–36)
MCV RBC AUTO: 78.4 FL (ref 80–100)
PDW BLD-RTO: 17 % (ref 12.4–15.4)
PLATELET # BLD: 304 K/UL (ref 135–450)
PMV BLD AUTO: 7.5 FL (ref 5–10.5)
POTASSIUM REFLEX MAGNESIUM: 3.7 MMOL/L (ref 3.5–5.1)
RBC # BLD: 4.06 M/UL (ref 4–5.2)
SODIUM BLD-SCNC: 139 MMOL/L (ref 136–145)
TOTAL SYPHILLIS IGG/IGM: NORMAL
TROPONIN: <0.01 NG/ML
WBC # BLD: 12.8 K/UL (ref 4–11)

## 2021-04-25 PROCEDURE — 6370000000 HC RX 637 (ALT 250 FOR IP): Performed by: OBSTETRICS & GYNECOLOGY

## 2021-04-25 PROCEDURE — 6360000002 HC RX W HCPCS: Performed by: OBSTETRICS & GYNECOLOGY

## 2021-04-25 PROCEDURE — 2580000003 HC RX 258: Performed by: OBSTETRICS & GYNECOLOGY

## 2021-04-25 PROCEDURE — 1200000000 HC SEMI PRIVATE

## 2021-04-25 PROCEDURE — 84484 ASSAY OF TROPONIN QUANT: CPT

## 2021-04-25 PROCEDURE — 80048 BASIC METABOLIC PNL TOTAL CA: CPT

## 2021-04-25 PROCEDURE — 93005 ELECTROCARDIOGRAM TRACING: CPT | Performed by: OBSTETRICS & GYNECOLOGY

## 2021-04-25 PROCEDURE — 85027 COMPLETE CBC AUTOMATED: CPT

## 2021-04-25 RX ADMIN — ACETAMINOPHEN 1000 MG: 500 TABLET ORAL at 14:08

## 2021-04-25 RX ADMIN — IBUPROFEN 800 MG: 800 TABLET, FILM COATED ORAL at 09:22

## 2021-04-25 RX ADMIN — ACETAMINOPHEN 1000 MG: 500 TABLET ORAL at 05:53

## 2021-04-25 RX ADMIN — SODIUM CHLORIDE, POTASSIUM CHLORIDE, SODIUM LACTATE AND CALCIUM CHLORIDE: 600; 310; 30; 20 INJECTION, SOLUTION INTRAVENOUS at 00:13

## 2021-04-25 RX ADMIN — DOCUSATE SODIUM 100 MG: 100 CAPSULE, LIQUID FILLED ORAL at 09:23

## 2021-04-25 RX ADMIN — DOCUSATE SODIUM 100 MG: 100 CAPSULE, LIQUID FILLED ORAL at 20:46

## 2021-04-25 RX ADMIN — Medication 20 ML: at 09:23

## 2021-04-25 RX ADMIN — OXYCODONE 5 MG: 5 TABLET ORAL at 17:53

## 2021-04-25 RX ADMIN — ACETAMINOPHEN 1000 MG: 500 TABLET ORAL at 22:04

## 2021-04-25 RX ADMIN — Medication 10 ML: at 20:47

## 2021-04-25 RX ADMIN — CEFAZOLIN SODIUM 2000 MG: 10 INJECTION, POWDER, FOR SOLUTION INTRAVENOUS at 01:46

## 2021-04-25 RX ADMIN — KETOROLAC TROMETHAMINE 30 MG: 30 INJECTION, SOLUTION INTRAMUSCULAR; INTRAVENOUS at 01:44

## 2021-04-25 RX ADMIN — IBUPROFEN 800 MG: 800 TABLET, FILM COATED ORAL at 17:53

## 2021-04-25 ASSESSMENT — PAIN SCALES - GENERAL: PAINLEVEL_OUTOF10: 7

## 2021-04-25 NOTE — CONSULTS
drug use. Drug: Marijuana. She reports that she does not drink alcohol. Family History:  family history includes High Blood Pressure in her father and mother; Stroke in her paternal grandfather. ,    Physical Exam:  /79   Pulse 57   Temp 97.9 °F (36.6 °C) (Oral)   Resp 18   Ht 5' 2\" (1.575 m)   Wt 207 lb (93.9 kg)   LMP 2020   SpO2 98%   Breastfeeding Unknown   BMI 37.86 kg/m²   Reproducible chest pain  General appearance: Appears comfortable. Well nourished  Eyes: Sclera clear, pupils equal  ENT: Moist mucus membranes, no thrush  Neck: Trachea midline, symmetrical  Cardiovascular: Regular rhythm, normal S1, S2. No murmur, gallop, rub. No edema in  lower extremities  Respiratory: Clear to auscultation bilaterally. No wheeze. Good inspiratory effort  Musculoskeletal: No cyanosis in digits, warm extremities  Neurologic: Cranial nerves grossly intact, no motor or speech deficits. Psychiatric: Normal affect. Alert and oriented to time, place and person. Skin: Warm, dry, normal turgor, no rash    Labs:  CBC:   Lab Results   Component Value Date    WBC 12.8 2021    RBC 4.06 2021    HGB 10.2 2021    HCT 31.9 2021    MCV 78.4 2021    MCH 25.1 2021    MCHC 32.0 2021    RDW 17.0 2021     2021    MPV 7.5 2021     BMP:  No results found for: NA, K, CL, CO2, BUN, CREATININE, CALCIUM, GFRAA, LABGLOM, GLUCOSE       EKG strips normal sinus rhythm sinus bradycardia    Discussed with attending physician    Problem List:    Active Problems:     delivery delivered  Resolved Problems:    * No resolved hospital problems. *       Assessment & Plan:   Chest pain  Atypical  Serial troponin, monitor on telemetry    Sinus bradycardia in a young person  Monitor on telemetry for 24 hours    Acute blood loss anemia status post packed RBC  Status post  section    Thank you for the opportunity to participate in the care of your patient. Brinda Alfaro MD   4/25/2021 2:29 PM

## 2021-04-25 NOTE — PROGRESS NOTES
Subjective:     Postpartum Day 1:  Delivery    The patient feels well but c/o chest pain when she walks. The patient denies emotional concerns. Pain is well controlled with current medications. The baby iswell. Urinary output is adequate. The patient is ambulating well. The patient is tolerating a normal diet. Flatus has been passed. Objective:      Patient Vitals for the past 8 hrs:   BP Temp Temp src Pulse Resp SpO2   21 1230 124/79 97.9 °F (36.6 °C) Oral 57 18    21 0936    58     21 0830 127/85 97.8 °F (36.6 °C) Oral (!) 46 18 98 %     General:    alert, appears stated age and cooperative   Bowel Sounds:  active   Lochia:  appropriate   Uterine Fundus:   firm   Incision:  healing well, no significant drainage, no dehiscence, no significant erythema   DVT Evaluation:  No evidence of DVT seen on physical exam.     Assessment:     Status post  section. Postoperative course complicated by PP hemorrhage, s/p 2 units of RBC. Chest pain and intermittent bradycardia- Will get EKG. Plan:     Continue current care.

## 2021-04-25 NOTE — FLOWSHEET NOTE
Plan of care is for postpartum unit to contact cardiology office tomorrow morning to see about setting pt up with a holter monitor per Dr. Aracely Gilmore.

## 2021-04-25 NOTE — FLOWSHEET NOTE
This RN spoke with Dr. Tommy Latham about pt's HR of 46 upon awakening this morning. Pt is asymptomatic. Rechecked HR at 0936 and it was 62. Her pulse ox was 98% at 0830. No new orders at this time. Will continue to monitor and update Dr. Tommy Latham as needed.

## 2021-04-26 VITALS
HEART RATE: 66 BPM | WEIGHT: 207 LBS | DIASTOLIC BLOOD PRESSURE: 79 MMHG | BODY MASS INDEX: 38.09 KG/M2 | RESPIRATION RATE: 16 BRPM | TEMPERATURE: 97.2 F | SYSTOLIC BLOOD PRESSURE: 130 MMHG | HEIGHT: 62 IN | OXYGEN SATURATION: 100 %

## 2021-04-26 PROBLEM — E66.9 OBESITY (BMI 30-39.9): Status: ACTIVE | Noted: 2021-04-26

## 2021-04-26 PROBLEM — R00.1 BRADYCARDIA: Status: ACTIVE | Noted: 2021-04-26

## 2021-04-26 PROBLEM — D62 ACUTE BLOOD LOSS ANEMIA: Status: ACTIVE | Noted: 2021-04-26

## 2021-04-26 PROCEDURE — 6370000000 HC RX 637 (ALT 250 FOR IP): Performed by: OBSTETRICS & GYNECOLOGY

## 2021-04-26 PROCEDURE — 2580000003 HC RX 258: Performed by: OBSTETRICS & GYNECOLOGY

## 2021-04-26 RX ADMIN — DOCUSATE SODIUM 100 MG: 100 CAPSULE, LIQUID FILLED ORAL at 10:33

## 2021-04-26 RX ADMIN — IBUPROFEN 800 MG: 800 TABLET, FILM COATED ORAL at 02:10

## 2021-04-26 RX ADMIN — ACETAMINOPHEN 1000 MG: 500 TABLET ORAL at 05:53

## 2021-04-26 RX ADMIN — IBUPROFEN 800 MG: 800 TABLET, FILM COATED ORAL at 10:33

## 2021-04-26 RX ADMIN — Medication 10 ML: at 10:34

## 2021-04-26 ASSESSMENT — PAIN SCALES - GENERAL: PAINLEVEL_OUTOF10: 2

## 2021-04-26 NOTE — ACP (ADVANCE CARE PLANNING)
Advanced Care Planning Note. Purpose of Encounter: Advanced care planning in light of bradycardia  Parties In Attendance: Patient  Decisional Capacity: Yes  Subjective: Patient denies CP, SOB, HA or abdominal pain  Objective: Cr 0.6  Goals of Care Determination: Patient wants full support (CPR, vent, surgery, HD, trach, PEG)  Plan:  PRBC  Code Status: Full code   Time spent on Advanced care Plannin minutes  Advanced Care Planning Documents: Completed advanced directives on chart, Apolinar Lanza is the POA.     Tito Bradley MD  2021 9:40 AM

## 2021-04-26 NOTE — PLAN OF CARE
Problem: Anxiety:  Goal: Level of anxiety will decrease  Description: Level of anxiety will decrease  4/25/2021 2037 by Claudette Charnley, RN  Outcome: Ongoing  4/25/2021 2036 by Claudette Charnley, RN  Outcome: Ongoing     Problem: Aspiration - Risk of:  Goal: Absence of aspiration  Description: Absence of aspiration  4/25/2021 2037 by Claudette Charnley, RN  Outcome: Ongoing  4/25/2021 2036 by Claudette Charnley, RN  Outcome: Ongoing     Problem: Tissue Perfusion - Uteroplacental, Altered:  Goal: Absence of abnormal fetal heart rate pattern  Description: Absence of abnormal fetal heart rate pattern  4/25/2021 2037 by Claudette Charnley, RN  Outcome: Ongoing  4/25/2021 2036 by Claudette Charnley, RN  Outcome: Ongoing     Problem: Venous Thromboembolism - Risk of:  Goal: Will show no signs or symptoms of venous thromboembolism  Description: Will show no signs or symptoms of venous thromboembolism  4/25/2021 2037 by Claudette Charnley, RN  Outcome: Ongoing  4/25/2021 2036 by Claudette Charnley, RN  Outcome: Ongoing     Problem: Pain:  Goal: Pain level will decrease  Description: Pain level will decrease  4/25/2021 2037 by Claudette Charnley, RN  Outcome: Ongoing  4/25/2021 2036 by Claudette Charnley, RN  Outcome: Ongoing  Goal: Control of acute pain  Description: Control of acute pain  4/25/2021 2037 by Claudette Charnley, RN  Outcome: Ongoing  4/25/2021 2036 by Claudette Charnley, RN  Outcome: Ongoing  Goal: Control of chronic pain  Description: Control of chronic pain  4/25/2021 2037 by Claudette Charnley, RN  Outcome: Ongoing  4/25/2021 2036 by Claudette Charnley, RN  Outcome: Ongoing     Problem: Discharge Planning:  Goal: Discharged to appropriate level of care  Description: Discharged to appropriate level of care  Outcome: Ongoing     Problem: Fluid Volume - Imbalance:  Goal: Absence of postpartum hemorrhage signs and symptoms  Description: Absence of postpartum hemorrhage signs and symptoms  Outcome: Ongoing  Goal: Absence of imbalanced fluid volume signs and symptoms

## 2021-04-26 NOTE — PROGRESS NOTES
oxyCODONE (ROXICODONE) immediate release tablet 10 mg, 10 mg, Oral, Q4H PRN, Johanna Williamson MD    docusate sodium (COLACE) capsule 100 mg, 100 mg, Oral, BID, Johanna Williamson MD, 100 mg at 04/26/21 1033    lansinoh lanolin ointment, , Topical, Q1H PRN, Johanna Williamson MD    Tetanus-Diphth-Acell Pertussis (BOOSTRIX) injection 0.5 mL, 0.5 mL, Intramuscular, Prior to discharge, Johanna Williamson MD    0.9 % sodium chloride infusion, , Intravenous, PRN, Sandi De Luna MD     Assessment/Plan:      Post Partum/PO: Continue Postpartum care  Chest pain/bradycardia: sinus bradycardia, now asymptomatic, appreciate internist  abel, their notes are pending, OK to discharge per them

## 2021-04-26 NOTE — PROGRESS NOTES
Hospitalist Progress Note      PCP: Christiano Bojorquez DO    Date of Admission: 4/24/2021      Subjective: Patient with some vaginal spotting. No CP, SOB, HA or abdominal pain. No HA, dizziness, LH or syncope. Medications:  Reviewed    Infusion Medications    lactated ringers Stopped (04/25/21 0146)    sodium chloride      sodium chloride       Scheduled Medications    sodium chloride flush  5-40 mL Intravenous 2 times per day    rho(D) immune globulin  300 mcg Intramuscular Once    ibuprofen  800 mg Oral 3 times per day    acetaminophen  1,000 mg Oral 3 times per day    docusate sodium  100 mg Oral BID    Tdap-Dtap  0.5 mL Intramuscular Prior to discharge     PRN Meds: sodium chloride flush, sodium chloride, acetaminophen, oxyCODONE **OR** oxyCODONE, lanolin, sodium chloride      Intake/Output Summary (Last 24 hours) at 4/26/2021 1413  Last data filed at 4/26/2021 1033  Gross per 24 hour   Intake 10 ml   Output    Net 10 ml       Physical Exam Performed:    /79   Pulse 66   Temp 97.2 °F (36.2 °C) (Oral)   Resp 16   Ht 5' 2\" (1.575 m)   Wt 207 lb (93.9 kg)   LMP 06/25/2020   SpO2 100%   Breastfeeding Unknown   BMI 37.86 kg/m²     General appearance: No apparent distress, appears stated age and cooperative. HEENT: Pupils equal, round, and reactive to light. Conjunctivae/corneas clear. Neck: Supple, with full range of motion. No jugular venous distention. Trachea midline. Respiratory:  Normal respiratory effort. Clear to auscultation, bilaterally without Rales/Wheezes/Rhonchi. Cardiovascular: Regular rate and rhythm with normal S1/S2 without murmurs, rubs or gallops. Abdomen: Soft, non-tender, non-distended with normal bowel sounds. Musculoskeletal: No clubbing, cyanosis or edema bilaterally. Full range of motion without deformity. Skin: Skin color, texture, turgor normal.  No rashes or lesions.   Neurologic:  Neurovascularly intact without any focal sensory/motor deficits. Cranial nerves: II-XII intact, grossly non-focal.  Psychiatric: Alert and oriented, thought content appropriate, normal insight  Capillary Refill: Brisk,3 seconds, normal   Peripheral Pulses: +2 palpable, equal bilaterally       Labs:   Recent Labs     21  1248 21  1620 21  0340   WBC 7.1 12.4* 12.8*   HGB 7.7* 11.0* 10.2*   HCT 24.7* 35.4* 31.9*    330 304     Recent Labs     21  1545      K 3.7      CO2 24   BUN 9   CREATININE 0.6   CALCIUM 8.4     No results for input(s): AST, ALT, BILIDIR, BILITOT, ALKPHOS in the last 72 hours. Recent Labs     21  1248   INR 1.00     Recent Labs     21  1545 21  1905 21  2205   TROPONINI <0.01 <0.01 <0.01       Urinalysis:      Lab Results   Component Value Date    NITRU Negative 2019    WBCUA 6-10 2019    BACTERIA Rare 2019    RBCUA 0-2 2019    BLOODU Negative 2019    SPECGRAV 1.023 2019    GLUCOSEU Negative 2019       Radiology:  No orders to display           Assessment/Plan:    Active Hospital Problems    Diagnosis    Acute blood loss anemia [D62]    Bradycardia [R00.1]    Obesity (BMI 30-39. 9) [E66.9]     delivery delivered [O82]     1. Doubt bradycardia requires further evaluation at this time as she is not symptomatic  2. If she has dizziness, LH, palpitations or syncope, outpatient event monitor is indicated  3. PCP should consider PSG to r/o DWIGHT  4. S/P 2 U PRBC for ABLA  5. Serial troponin negative  6. PCP can consider TSH as outpatient    DVT Prophylaxis: SCD  Diet: DIET GENERAL;  Code Status: Full Code      Dispo - Home    Discussed with patient and nursing. Medically stable for DC to home when ok with primary service. F/U with PCP to address above. [facility] are signing off at this time.     Evaristo Perez MD

## 2021-04-27 LAB
EKG ATRIAL RATE: 53 BPM
EKG DIAGNOSIS: NORMAL
EKG P AXIS: 55 DEGREES
EKG P-R INTERVAL: 132 MS
EKG Q-T INTERVAL: 382 MS
EKG QRS DURATION: 64 MS
EKG QTC CALCULATION (BAZETT): 358 MS
EKG R AXIS: 34 DEGREES
EKG T AXIS: 24 DEGREES
EKG VENTRICULAR RATE: 53 BPM

## 2021-04-27 PROCEDURE — 93010 ELECTROCARDIOGRAM REPORT: CPT | Performed by: INTERNAL MEDICINE

## 2021-05-07 NOTE — H&P
Department of Obstetrics and Gynecology   Obstetrics History and Physical           CHIEF COMPLAINT:   section     HISTORY OF PRESENT ILLNESS:       The patient is a 21 y.o. female at 36w0d.            OB History         2    Para   1    Term   1            AB        Living   1        SAB        TAB        Ectopic        Molar        Multiple        Live Births   1             Patient presents with a chief complaint as above and is being admitted for repeat  section.      Estimated Due Date: Estimated Date of Delivery: 21     PRENATAL CARE:     Complicated by: Previous C/S, anemia, scant prenatal care     PAST OB HISTORY:           OB History         2    Para   1    Term   1            AB        Living   1        SAB        TAB        Ectopic        Molar        Multiple        Live Births   1                   Past Medical History:    Past Medical History             Diagnosis Date    Anemia       Fe and Venofer         Past Surgical History:    Past Surgical History             Procedure Laterality Date     SECTION N/A 2019      SECTION performed by Amirah Montalvo MD at [facility]         Allergies:  Patient has no known allergies.     Social History:    Social History               Socioeconomic History    Marital status: Single       Spouse name: Not on file    Number of children: Not on file    Years of education: Not on file    Highest education level: Not on file   Occupational History    Not on file   Social Needs    Financial resource strain: Not on file    Food insecurity       Worry: Not on file       Inability: Not on file    Transportation needs       Medical: Not on file       Non-medical: Not on file   Tobacco Use    Smoking status: Never Smoker    Smokeless tobacco: Never Used   Substance and Sexual Activity    Alcohol use: No    Drug use: Not Currently       Types: Marijuana       Comment: Last in August 2019 x 1  Sexual activity: Yes       Partners: Male   Lifestyle    Physical activity       Days per week: Not on file       Minutes per session: Not on file    Stress: Not on file   Relationships    Social connections       Talks on phone: Not on file       Gets together: Not on file       Attends Latter day service: Not on file       Active member of club or organization: Not on file       Attends meetings of clubs or organizations: Not on file       Relationship status: Not on file    Intimate partner violence       Fear of current or ex partner: Not on file       Emotionally abused: Not on file       Physically abused: Not on file       Forced sexual activity: Not on file   Other Topics Concern    Not on file   Social History Narrative    Not on file         Family History:   Family History             Problem Relation Age of Onset    High Blood Pressure Mother      Stroke Paternal Grandfather      High Blood Pressure Father           Medications Prior to Admission:    Prescriptions Prior to Admission   Medications Prior to Admission: Prenatal MV-Min-Fe Fum-FA-DHA (PRENATAL 1 PO), Take 1 tablet by mouth daily  ferrous sulfate 325 (65 Fe) MG tablet, Take 325 mg by mouth daily (with breakfast)  [DISCONTINUED] ibuprofen (ADVIL;MOTRIN) 800 MG tablet, Take 1 tablet by mouth every 6 hours as needed for Pain  [DISCONTINUED] albuterol (ACCUNEB) 0.63 MG/3ML nebulizer solution, Take 1 ampule by nebulization every 6 hours as needed for Wheezing Indications: inhaler        REVIEW OF SYSTEMS:     CONSTITUTIONAL:  negative  RESPIRATORY:  negative  CARDIOVASCULAR:  negative  GASTROINTESTINAL:  negative  ALLERGIC/IMMUNOLOGIC:  negative  NEUROLOGICAL:  negative  BEHAVIOR/PSYCH:  negative     PHYSICAL EXAM:  Vitals        Vitals:     04/24/21 0832 04/24/21 0855   BP:   131/65   Pulse:   74   Resp:   18   Temp:   98.1 °F (36.7 °C)   TempSrc:   Oral   Weight: 207 lb (93.9 kg)     Height: 5' 2\" (1.575 m)           General appearance:  awake, alert, cooperative, no apparent distress, and appears stated age  Neurologic:  Awake, alert, oriented to name, place and time. Lungs:  No increased work of breathing, good air exchange  Abdomen:  Soft, non tender, gravid, consistent with her gestational age, EFW by Jon's manouever was 4000 gms   Fetal heart rate:  Reassuring. Pelvis:  Adequate pelvis  Cervix: deferred  Contraction frequency:  4 minutes     Membranes:  Intact     ASSESSMENT AND PLAN: 39 wks, Previous  Section   Plan proceed to  Section. Pt counseled on RBA of procedure with risk including but not limited to bleeding, hemorrhage, blood transfusion, infection, damage to organs, MI, CVA, TE and even death.  Pt verbalizes understanding and agrees to proceed.         Fetus: Reassuring  GBS: Yes

## (undated) DEVICE — SUTURE VCRL SZ 0 L36IN ABSRB UD L36MM CT-1 1/2 CIR J946H

## (undated) DEVICE — CHLORAPREP 26ML ORANGE

## (undated) DEVICE — PENCIL SMK EVAC L10FT TBNG NONSTICK ESU BLDE PLUMEPEN ELITE

## (undated) DEVICE — WOUND RETRACTOR AND PROTECTOR: Brand: ALEXIS WOUND PROTECTOR-RETRACTOR

## (undated) DEVICE — PACK PROCEDURE SURG C SECT REV 1

## (undated) DEVICE — Z CONVERTED USE 2275207 CLOTH PREP W7.5XL7.5IN 2% CHG SKIN ALC AND RNS FREE

## (undated) DEVICE — PAD DRESSING TELFA OUCHLESS NONADH 3X8IN

## (undated) DEVICE — SUTURE ABSRB BRAID COAT UD CTX 3-0 36IN VCRL J980H

## (undated) DEVICE — 3M™ STERI-STRIP™ REINFORCED ADHESIVE SKIN CLOSURES, R1549, 1/2 IN X 2 IN (12 MM X 50 MM), 6 STRIPS/ENVELOPE: Brand: 3M™ STERI-STRIP™

## (undated) DEVICE — 50" SINGLE PATIENT USE HOVERMATT: Brand: SINGLE PATIENT USE HOVERMATT

## (undated) DEVICE — SUTURE VCRL SZ 4-0 L18IN ABSRB UD L24MM PS-1 3/8 CIR PRIM J682H

## (undated) DEVICE — 9165 UNIVERSAL PATIENT PLATE: Brand: 3M™

## (undated) DEVICE — BLADE CLIPPER GEN PURP NS

## (undated) DEVICE — GLOVE SURG SZ 65 THK91MIL LTX FREE SYN POLYISOPRENE

## (undated) DEVICE — 3M(TM) MEDIPORE(TM) +PAD SOFT CLOTH ADHESIVE WOUND DRESSING 3571: Brand: 3M™ MEDIPORE™

## (undated) DEVICE — GARMENT,MEDLINE,DVT,INT,CALF,MED, GEN2: Brand: MEDLINE

## (undated) DEVICE — 3M™ STERI-STRIP™ COMPOUND BENZOIN TINCTURE 40 BAGS/CARTON 4 CARTONS/CASE C1544: Brand: 3M™ STERI-STRIP™

## (undated) DEVICE — SUTURE VCRL SZ 3-0 L27IN ABSRB UD L36MM CT-1 1/2 CIR J258H

## (undated) DEVICE — STAPLER SKIN SQ 30 ABSRB STPL DISP INSORB

## (undated) DEVICE — TRAY URIN CATH 16FR DRNGE BG STATLOK STBL DEV F SURSTP

## (undated) DEVICE — SUTURE ABSORBABLE BRAIDED 0 CT1 18 IN STRATAFIX SPRL SXMD1B409

## (undated) DEVICE — SUTURE CHROMIC GUT SZ 0 L36IN ABSRB BRN CTX L48MM 1/2 CIR 904H